# Patient Record
Sex: FEMALE | Race: WHITE | NOT HISPANIC OR LATINO | Employment: UNEMPLOYED | URBAN - METROPOLITAN AREA
[De-identification: names, ages, dates, MRNs, and addresses within clinical notes are randomized per-mention and may not be internally consistent; named-entity substitution may affect disease eponyms.]

---

## 2020-05-24 ENCOUNTER — OFFICE VISIT (OUTPATIENT)
Dept: URGENT CARE | Facility: CLINIC | Age: 40
End: 2020-05-24
Payer: MEDICAID

## 2020-05-24 VITALS — OXYGEN SATURATION: 99 % | TEMPERATURE: 97 F | HEART RATE: 74 BPM

## 2020-05-24 DIAGNOSIS — R05.9 COUGH: ICD-10-CM

## 2020-05-24 DIAGNOSIS — J06.9 VIRAL URI: Primary | ICD-10-CM

## 2020-05-24 PROCEDURE — 99203 OFFICE O/P NEW LOW 30 MIN: CPT | Mod: S$GLB,,, | Performed by: PHYSICIAN ASSISTANT

## 2020-05-24 PROCEDURE — U0003 INFECTIOUS AGENT DETECTION BY NUCLEIC ACID (DNA OR RNA); SEVERE ACUTE RESPIRATORY SYNDROME CORONAVIRUS 2 (SARS-COV-2) (CORONAVIRUS DISEASE [COVID-19]), AMPLIFIED PROBE TECHNIQUE, MAKING USE OF HIGH THROUGHPUT TECHNOLOGIES AS DESCRIBED BY CMS-2020-01-R: HCPCS

## 2020-05-24 PROCEDURE — 99203 PR OFFICE/OUTPT VISIT, NEW, LEVL III, 30-44 MIN: ICD-10-PCS | Mod: S$GLB,,, | Performed by: PHYSICIAN ASSISTANT

## 2020-05-24 RX ORDER — ALBUTEROL SULFATE 90 UG/1
2 AEROSOL, METERED RESPIRATORY (INHALATION) EVERY 6 HOURS PRN
Qty: 18 G | Refills: 1 | Status: SHIPPED | OUTPATIENT
Start: 2020-05-24

## 2020-05-24 RX ORDER — BENZONATATE 100 MG/1
200 CAPSULE ORAL 3 TIMES DAILY PRN
Qty: 30 CAPSULE | Refills: 1 | Status: SHIPPED | OUTPATIENT
Start: 2020-05-24 | End: 2020-06-03

## 2020-05-24 RX ORDER — FLUTICASONE PROPIONATE 50 MCG
1 SPRAY, SUSPENSION (ML) NASAL 2 TIMES DAILY
Qty: 16 ML | Refills: 3 | Status: SHIPPED | OUTPATIENT
Start: 2020-05-24

## 2020-05-24 RX ORDER — CETIRIZINE HYDROCHLORIDE 10 MG/1
10 TABLET ORAL DAILY
Qty: 30 TABLET | Refills: 3 | Status: SHIPPED | OUTPATIENT
Start: 2020-05-24

## 2020-05-24 NOTE — PATIENT INSTRUCTIONS
You must understand that you've received an Urgent Care treatment only and that you may be released before all your medical problems are known or treated. You, the patient, will arrange for follow up care as instructed.  Follow up with your PCP or specialty clinic as directed if not improved or as needed. You can call [phone] to schedule an appointment with the appropriate provider.  If your condition worsens we recommend that you receive another evaluation at the Emergency Department for any concerns or worsening of condition.  Patient aware and verbalized understanding.    Tessalon Perles RX as prescribed for daytime cough.  Albuterol Inhaler RX as prescribed for cough/wheezing as needed.  Flonase Nasal [place] [facility] as prescribed for nasal congestion/seasonal allergies.   Zyrtec RX as prescribed daily for seasonal allergies.  Increase fluids and rest is important.  Avoid contact with sick individuals.   Encouraged good hand-hygiene.  Humidifier use at home.  Take OTC Tylenol or Ibuprofen every 4-6 hours as needed for fever or pain.  Follow-up with your PCP for further evaluation as needed.  ER precautions given to patient.  Patient aware and verbalizes understanding.    You were tested for COVID-19 today in clinic.   We will call you with results.   Counseled patient and answered questions in regards to COVID-19 testing.   Advised patient to go home/quarantine, treat symptoms, avoid contact with others until symptoms are resolved.   Info given for virtual visit, covid 19 information line, state info line.   Strict ER precautions if trouble breathing- call ER prior to entry.   Follow local/state guidelines per covid emergency.   Patient aware, verbalized understanding and agreed with plan of care.    IF NOT IMPROVING, FOLLOW UP WITH VIRTUAL ONLINE VISIT WITH A PROVIDER 24/7- FOR MORE INFORMATION OR TO DOWNLOAD THE [facility], VISIT [facility] ANYWHERE CARE AT [email or web address]    FOR 24/7 NURSE ADVICE, CALL  [phone]  FOR COVID 19 RELATED QUESTIONS, CALL THE [facility] covid hotline: [phone]    [place] FOR UP TO DATE INFORMATION:  Text or dial 211, test keyword [id number] OR DIAL 211    HELPFUL EXTERNAL RESOURCES:  OFFICE OF PUBLIC HEALTH: [place]   Http://ldh.[place].gov/  8-421-127-1610    [facility] FOR DISEASE CONTR[facility]  Https://www.cdc.gov/    WORLD HEALTH ORGANIZATION (WHO)  Https://www.who.int/      Viral Upper Respiratory Illness with Wheezing (Adult)  You have a viral upper respiratory illness (URI), which is another term for the common cold. When the infection causes a lot of irritation, the air passages can go into spasm. This causes wheezing and shortness of breath.    This illness is contagious during the first few days. It is spread through the air by coughing and sneezing. It may also be spread by direct contact (touching the sick person and then touching your own eyes, nose, or mouth). Frequent handwashing will decrease the risk.  Most viral illnesses go away within 7 to 10 days with rest and simple home remedies. Sometimes the illness may last for several weeks. Antibiotics will not kill a virus, and they are generally not prescribed for this condition.  Home care  · If symptoms are severe, rest at home for the first 2 to 3 days. When you resume activity, don't let yourself get too tired.  · Avoid being exposed to cigarette smoke (yours or others).  · You may use acetaminophen or ibuprofen to control pain and fever, unless another medicine was prescribed. (Note: If you have chronic liver or kidney disease, have ever had a stomach ulcer or gastrointestinal bleeding, or are taking blood-thinning medicines, talk with your healthcare provider before using these medicines.) Aspirin should never be given to anyone under 18 years of age who is ill with a viral infection or fever. It may cause severe liver or brain damage.  · Your appetite may be poor, so a light diet is fine. Avoid dehydration by  drinking 6 to 8 glasses of fluids per day (water, soft drinks, juices, tea, or soup). Extra fluids will help loosen secretions in the nose and lungs.  · Over-the-counter cold medicines will not shorten the length of time youre sick, but they may be helpful for the following symptoms: cough, sore throat, and nasal and sinus congestion. (Note: Do not use decongestants if you have high blood pressure.)  Follow-up care  Follow up with your healthcare provider, or as advised.  When to seek medical advice  Call your healthcare provider right away if any of these occur:  · Cough with lots of colored sputum (mucus)  · Severe headache; face, neck, or ear pain  · Difficulty swallowing due to throat pain  · Fever of 100.4ºF (38ºC) or higher, or as directed by your healthcare provider  Call 911, or get immediate medical care  Call emergency services right away if any of these occur:  · Chest pain, shortness of breath, worsening wheezing, or difficulty breathing  · Coughing up blood  · Inability to swallow due to throat pain  Date Last Reviewed: 9/13/2015  © 2322-4544 [employer]. [address] [place] [address]. All rights reserved. This information is not intended as a substitute for professional medical care. Always follow your healthcare professional's instructions.

## 2020-05-24 NOTE — PROGRESS NOTES
Subjective:       Patient ID: Meenu Dias is a 39 y.o. female.    Vitals:  oral temperature is 97 °F (36.1 °C). Her pulse is 74. Her oxygen saturation is 99%.     Chief Complaint: Cough    Patient presents to urgent care with cough, sinus congestion/pressure and postnasal drip x 1 week. Patient denies fever, chills, active CP, SOB, abdominal pain, N/V/D/C or headache. Patient reports that she has not tried anything OTC for symptoms prior to arrival.    Cough   This is a new problem. The current episode started 1 to 4 weeks ago. The problem has been unchanged. The cough is productive of sputum. Associated symptoms include nasal congestion, postnasal drip and wheezing. Pertinent negatives include no chest pain, chills, ear congestion, ear pain, eye redness, fever, headaches, heartburn, hemoptysis, myalgias, rash, rhinorrhea, sore throat, shortness of breath, sweats or weight loss. Nothing aggravates the symptoms. She has tried nothing for the symptoms.       Constitution: Negative for chills, sweating, fatigue and fever.   HENT: Positive for congestion, postnasal drip and sinus pressure. Negative for ear pain, drooling, nosebleeds, foreign body in nose, sinus pain, sore throat, trouble swallowing and voice change.    Neck: Negative for neck pain, neck stiffness, painful lymph nodes and neck swelling.   Cardiovascular: Negative for chest pain, leg swelling, palpitations, sob on exertion and passing out.   Eyes: Negative for eye pain, eye redness, photophobia, double vision, blurred vision and eyelid swelling.   Respiratory: Positive for cough, sputum production and wheezing. Negative for sleep apnea, chest tightness, bloody sputum, COPD, shortness of breath, stridor and asthma.    Gastrointestinal: Negative for abdominal pain, abdominal bloating, nausea, vomiting, constipation, diarrhea and heartburn.   Musculoskeletal: Negative for joint pain, joint swelling, abnormal ROM of joint, back pain, muscle cramps and muscle  ache.   Skin: Negative for rash and hives.   Allergic/Immunologic: Negative for seasonal allergies, food allergies, asthma, hives, itching and sneezing.   Neurological: Negative for dizziness, history of vertigo, light-headedness, passing out, loss of balance, headaches, altered mental status, loss of consciousness and seizures.   Hematologic/Lymphatic: Negative for swollen lymph nodes.   Psychiatric/Behavioral: Negative for altered mental status and nervous/anxious. The patient is not nervous/anxious.        Objective:      Physical Exam   Constitutional: She is oriented to person, place, and time. She appears well-developed and well-nourished. She is cooperative.  Non-toxic appearance. She does not appear ill. No distress.   HENT:   Head: Normocephalic and atraumatic.   Right Ear: Hearing, tympanic membrane, external ear and ear canal normal.   Left Ear: Hearing, tympanic membrane, external ear and ear canal normal.   Nose: Mucosal edema and rhinorrhea present. No nasal deformity. No epistaxis. Right sinus exhibits no maxillary sinus tenderness and no frontal sinus tenderness. Left sinus exhibits no maxillary sinus tenderness and no frontal sinus tenderness.   Mouth/Throat: Uvula is midline and mucous membranes are normal. No trismus in the jaw. Normal dentition. No uvula swelling. Posterior oropharyngeal erythema and cobblestoning present. No oropharyngeal exudate or posterior oropharyngeal edema.   Eyes: Conjunctivae and lids are normal. No scleral icterus.   Neck: Trachea normal, normal range of motion, full passive range of motion without pain and phonation normal. Neck supple. No neck rigidity. No edema and no erythema present.   Cardiovascular: Normal rate, regular rhythm, normal heart sounds, intact distal pulses and normal pulses.   Pulmonary/Chest: Effort normal and breath sounds normal. No accessory muscle usage or stridor. No respiratory distress. She has no decreased breath sounds. She has no wheezes.  She has no rhonchi. She has no rales.   Abdominal: Normal appearance.   Musculoskeletal: Normal range of motion. She exhibits no edema or deformity.   Lymphadenopathy:     She has no cervical adenopathy.   Neurological: She is alert and oriented to person, place, and time. She exhibits normal muscle tone. Coordination normal.   Skin: Skin is warm, dry, intact, not diaphoretic, not pale and no rash. Capillary refill takes less than 2 seconds.   Psychiatric: She has a normal mood and affect. Her speech is normal and behavior is normal. Judgment and thought content normal. Cognition and memory are normal.   Nursing note and vitals reviewed.        Assessment:       1. Viral URI    2. Cough        Plan:         Viral URI  -     fluticasone propionate (FLONASE ALLERGY RELIEF) 50 mcg/actuation nasal spray; 1 spray (50 mcg total) by Each Nostril route 2 (two) times daily.  Dispense: 16 mL; Refill: 3  -     cetirizine (ZYRTEC) 10 MG tablet; Take 1 tablet (10 mg total) by mouth once daily.  Dispense: 30 tablet; Refill: 3    Cough  -     COVID-19 Routine Screening  -     benzonatate (TESSALON PERLES) 100 MG capsule; Take 2 capsules (200 mg total) by mouth 3 (three) times daily as needed for Cough.  Dispense: 30 capsule; Refill: 1  -     albuterol (PROVENTIL/VENTOLIN HFA) 90 mcg/actuation inhaler; Inhale 2 puffs into the lungs every 6 (six) hours as needed for Wheezing. Rescue  Dispense: 18 g; Refill: 1      Patient Instructions   You must understand that you've received an Urgent Care treatment only and that you may be released before all your medical problems are known or treated. You, the patient, will arrange for follow up care as instructed.  Follow up with your PCP or specialty clinic as directed if not improved or as needed. You can call [phone] to schedule an appointment with the appropriate provider.  If your condition worsens we recommend that you receive another evaluation at the Emergency Department for any  concerns or worsening of condition.  Patient aware and verbalized understanding.    Tessalon Perles RX as prescribed for daytime cough.  Albuterol Inhaler RX as prescribed for cough/wheezing as needed.  Flonase Nasal [place] RX as prescribed for nasal congestion/seasonal allergies.   Zyrtec RX as prescribed daily for seasonal allergies.  Increase fluids and rest is important.  Avoid contact with sick individuals.   Encouraged good hand-hygiene.  Humidifier use at home.  Take OTC Tylenol or Ibuprofen every 4-6 hours as needed for fever or pain.  Follow-up with your PCP for further evaluation as needed.  ER precautions given to patient.  Patient aware and verbalizes understanding.    You were tested for COVID-19 today in clinic.   We will call you with results.   Counseled patient and answered questions in regards to COVID-19 testing.   Advised patient to go home/quarantine, treat symptoms, avoid contact with others until symptoms are resolved.   Info given for virtual visit, covid 19 information line, state info line.   Strict ER precautions if trouble breathing- call ER prior to entry.   Follow local/state guidelines per covid emergency.   Patient aware, verbalized understanding and agreed with plan of care.    IF NOT IMPROVING, FOLLOW UP WITH VIRTUAL ONLINE VISIT WITH A PROVIDER 24/7- FOR MORE INFORMATION OR TO DOWNLOAD THE [facility], VISIT [facility] ANYWHERE CARE AT [email or web address]    FOR 24/7 NURSE ADVICE, CALL [phone]  FOR COVID 19 RELATED QUESTIONS, CALL THE [facility] covid hotline: [phone]    [place] FOR UP TO DATE INFORMATION:  Text or dial 211, test keyword [id number] OR DIAL 211    HELPFUL EXTERNAL RESOURCES:  OFFICE OF PUBLIC HEALTH: [place]   Http://ldh.[place].gov/  1-345.707.1477    [facility]ENTER FOR DISEASE CONTROL  Https://www.cdc.gov/    WORLD HEALTH ORGANIZATION (WHO)  Https://www.who.int/      Viral Upper Respiratory Illness with Wheezing (Adult)  You have a viral upper respiratory illness  (URI), which is another term for the common cold. When the infection causes a lot of irritation, the air passages can go into spasm. This causes wheezing and shortness of breath.    This illness is contagious during the first few days. It is spread through the air by coughing and sneezing. It may also be spread by direct contact (touching the sick person and then touching your own eyes, nose, or mouth). Frequent handwashing will decrease the risk.  Most viral illnesses go away within 7 to 10 days with rest and simple home remedies. Sometimes the illness may last for several weeks. Antibiotics will not kill a virus, and they are generally not prescribed for this condition.  Home care  · If symptoms are severe, rest at home for the first 2 to 3 days. When you resume activity, don't let yourself get too tired.  · Avoid being exposed to cigarette smoke (yours or others).  · You may use acetaminophen or ibuprofen to control pain and fever, unless another medicine was prescribed. (Note: If you have chronic liver or kidney disease, have ever had a stomach ulcer or gastrointestinal bleeding, or are taking blood-thinning medicines, talk with your healthcare provider before using these medicines.) Aspirin should never be given to anyone under 18 years of age who is ill with a viral infection or fever. It may cause severe liver or brain damage.  · Your appetite may be poor, so a light diet is fine. Avoid dehydration by drinking 6 to 8 glasses of fluids per day (water, soft drinks, juices, tea, or soup). Extra fluids will help loosen secretions in the nose and lungs.  · Over-the-counter cold medicines will not shorten the length of time youre sick, but they may be helpful for the following symptoms: cough, sore throat, and nasal and sinus congestion. (Note: Do not use decongestants if you have high blood pressure.)  Follow-up care  Follow up with your healthcare provider, or as advised.  When to seek medical advice  Call your  healthcare provider right away if any of these occur:  · Cough with lots of colored sputum (mucus)  · Severe headache; face, neck, or ear pain  · Difficulty swallowing due to throat pain  · Fever of 100.4ºF (38ºC) or higher, or as directed by your healthcare provider  Call 911, or get immediate medical care  Call emergency services right away if any of these occur:  · Chest pain, shortness of breath, worsening wheezing, or difficulty breathing  · Coughing up blood  · Inability to swallow due to throat pain  Date Last Reviewed: 9/13/2015 © 2000-2017 [employer]. [address] [place] [address]. All rights reserved. This information is not intended as a substitute for professional medical care. Always follow your healthcare professional's instructions.

## 2020-05-25 LAB — SARS-COV-2 RNA RESP QL NAA+PROBE: NOT DETECTED

## 2020-05-26 ENCOUNTER — TELEPHONE (OUTPATIENT)
Dept: URGENT CARE | Facility: CLINIC | Age: 40
End: 2020-05-26